# Patient Record
Sex: FEMALE | Race: WHITE | NOT HISPANIC OR LATINO | Employment: PART TIME | URBAN - METROPOLITAN AREA
[De-identification: names, ages, dates, MRNs, and addresses within clinical notes are randomized per-mention and may not be internally consistent; named-entity substitution may affect disease eponyms.]

---

## 2024-02-29 ENCOUNTER — LAB (OUTPATIENT)
Dept: LAB | Facility: LAB | Age: 44
End: 2024-02-29
Payer: COMMERCIAL

## 2024-02-29 ENCOUNTER — OFFICE VISIT (OUTPATIENT)
Dept: PRIMARY CARE | Facility: CLINIC | Age: 44
End: 2024-02-29
Payer: COMMERCIAL

## 2024-02-29 ENCOUNTER — HOSPITAL ENCOUNTER (OUTPATIENT)
Dept: RADIOLOGY | Facility: CLINIC | Age: 44
Discharge: HOME | End: 2024-02-29
Payer: COMMERCIAL

## 2024-02-29 VITALS — HEART RATE: 76 BPM | DIASTOLIC BLOOD PRESSURE: 75 MMHG | SYSTOLIC BLOOD PRESSURE: 127 MMHG

## 2024-02-29 DIAGNOSIS — E78.00 PURE HYPERCHOLESTEROLEMIA: ICD-10-CM

## 2024-02-29 DIAGNOSIS — Z85.43 HISTORY OF OVARIAN CANCER: ICD-10-CM

## 2024-02-29 DIAGNOSIS — M54.2 NECK PAIN: ICD-10-CM

## 2024-02-29 DIAGNOSIS — Z85.43 HISTORY OF OVARIAN CANCER: Primary | ICD-10-CM

## 2024-02-29 DIAGNOSIS — R51.9 NONINTRACTABLE EPISODIC HEADACHE, UNSPECIFIED HEADACHE TYPE: ICD-10-CM

## 2024-02-29 LAB
ALBUMIN SERPL BCP-MCNC: 4.6 G/DL (ref 3.4–5)
ALP SERPL-CCNC: 65 U/L (ref 33–110)
ALT SERPL W P-5'-P-CCNC: 27 U/L (ref 7–45)
ANION GAP SERPL CALC-SCNC: 18 MMOL/L (ref 10–20)
AST SERPL W P-5'-P-CCNC: 22 U/L (ref 9–39)
BILIRUB SERPL-MCNC: 1 MG/DL (ref 0–1.2)
BUN SERPL-MCNC: 11 MG/DL (ref 6–23)
CALCIUM SERPL-MCNC: 10.2 MG/DL (ref 8.6–10.6)
CANCER AG125 SERPL-ACNC: 4.8 U/ML (ref 0–30.2)
CHLORIDE SERPL-SCNC: 104 MMOL/L (ref 98–107)
CHOLEST SERPL-MCNC: 287 MG/DL (ref 0–199)
CHOLESTEROL/HDL RATIO: 4.3
CO2 SERPL-SCNC: 27 MMOL/L (ref 21–32)
CREAT SERPL-MCNC: 0.78 MG/DL (ref 0.5–1.05)
CRP SERPL-MCNC: 0.44 MG/DL
EGFRCR SERPLBLD CKD-EPI 2021: >90 ML/MIN/1.73M*2
ERYTHROCYTE [DISTWIDTH] IN BLOOD BY AUTOMATED COUNT: 14.5 % (ref 11.5–14.5)
ERYTHROCYTE [SEDIMENTATION RATE] IN BLOOD BY WESTERGREN METHOD: 12 MM/H (ref 0–20)
GLUCOSE SERPL-MCNC: 93 MG/DL (ref 74–99)
HCT VFR BLD AUTO: 42.7 % (ref 36–46)
HDLC SERPL-MCNC: 66.9 MG/DL
HGB BLD-MCNC: 13.5 G/DL (ref 12–16)
LDLC SERPL CALC-MCNC: 202 MG/DL
MCH RBC QN AUTO: 30.1 PG (ref 26–34)
MCHC RBC AUTO-ENTMCNC: 31.6 G/DL (ref 32–36)
MCV RBC AUTO: 95 FL (ref 80–100)
NON HDL CHOLESTEROL: 220 MG/DL (ref 0–149)
NRBC BLD-RTO: 0 /100 WBCS (ref 0–0)
PLATELET # BLD AUTO: 419 X10*3/UL (ref 150–450)
POTASSIUM SERPL-SCNC: 5.8 MMOL/L (ref 3.5–5.3)
PROT SERPL-MCNC: 7.9 G/DL (ref 6.4–8.2)
PROT SERPL-MCNC: 7.9 G/DL (ref 6.4–8.2)
RBC # BLD AUTO: 4.49 X10*6/UL (ref 4–5.2)
SODIUM SERPL-SCNC: 143 MMOL/L (ref 136–145)
TRIGL SERPL-MCNC: 90 MG/DL (ref 0–149)
TSH SERPL-ACNC: 1.95 MIU/L (ref 0.44–3.98)
VLDL: 18 MG/DL (ref 0–40)
WBC # BLD AUTO: 5.8 X10*3/UL (ref 4.4–11.3)

## 2024-02-29 PROCEDURE — 84443 ASSAY THYROID STIM HORMONE: CPT

## 2024-02-29 PROCEDURE — 86038 ANTINUCLEAR ANTIBODIES: CPT

## 2024-02-29 PROCEDURE — 72040 X-RAY EXAM NECK SPINE 2-3 VW: CPT | Performed by: RADIOLOGY

## 2024-02-29 PROCEDURE — 99214 OFFICE O/P EST MOD 30 MIN: CPT | Performed by: FAMILY MEDICINE

## 2024-02-29 PROCEDURE — 85027 COMPLETE CBC AUTOMATED: CPT

## 2024-02-29 PROCEDURE — 84155 ASSAY OF PROTEIN SERUM: CPT

## 2024-02-29 PROCEDURE — 36415 COLL VENOUS BLD VENIPUNCTURE: CPT

## 2024-02-29 PROCEDURE — 72040 X-RAY EXAM NECK SPINE 2-3 VW: CPT

## 2024-02-29 PROCEDURE — 80061 LIPID PANEL: CPT

## 2024-02-29 PROCEDURE — 84165 PROTEIN E-PHORESIS SERUM: CPT

## 2024-02-29 PROCEDURE — 86140 C-REACTIVE PROTEIN: CPT

## 2024-02-29 PROCEDURE — 80053 COMPREHEN METABOLIC PANEL: CPT

## 2024-02-29 PROCEDURE — 85652 RBC SED RATE AUTOMATED: CPT

## 2024-02-29 PROCEDURE — 86304 IMMUNOASSAY TUMOR CA 125: CPT

## 2024-02-29 PROCEDURE — 84165 PROTEIN E-PHORESIS SERUM: CPT | Performed by: FAMILY MEDICINE

## 2024-02-29 PROCEDURE — 1036F TOBACCO NON-USER: CPT | Performed by: FAMILY MEDICINE

## 2024-02-29 NOTE — PROGRESS NOTES
Subjective   Patient ID: Vickie Arriaga is a 43 y.o. female who presents for Ham, neck pain    HPI   neck dull pain was at 3-7/10 in the past few mos. pt denies ext  weakness or numbness. pt controlled BM and bladder well. Pt also noticed dull HA lasting a few days each time lately.  No blurry vision, tinnitus, dizziness. no imbalance or falls. No recent injury, wt loss, sweating, nausea, abd pain. Normal urination and bm. No cp, leg pain    Review of Systems    Objective   /75   Pulse 76     Physical Exam  NAD, well groomed, eyes: PERRLA, No sclera icterus. neck: supple, no cervical or axillary lymphadenopathy,  lungs: CTA b/l, heart: RRR, No LE edema, normal pedal pulses, abd: soft, no tenderness, BS+, mild posterior neck tenderness, normal strength and sensation  at bilateral extremities.  Good balance. CNII-XII were grossly intact, good judgment and memory. No depressed mood.    Assessment/Plan   Problem List Items Addressed This Visit             ICD-10-CM    Nonintractable episodic headache R51.9     Check labs for eval         Relevant Orders    NEIL with Reflex to AZEB    C-Reactive Protein (Completed)    Sedimentation Rate (Completed)    Serum Protein Electrophoresis    TSH with reflex to Free T4 if abnormal (Completed)    Comprehensive Metabolic Panel (Completed)    CBC (Completed)    Neck pain M54.2     Cold compress. Check xr         Relevant Orders    XR cervical spine 2-3 views    Pure hypercholesterolemia E78.00     Hyperlipidemia, not  on statin. heck labs for evaluation.  Health diet and regular exercise. Decrease calorie intake to lose wt.  f/u in 3 mos.            Relevant Orders    Lipid panel (Completed)    History of ovarian cancer - Primary Z85.43    Relevant Orders     (Completed)

## 2024-03-01 ENCOUNTER — TELEPHONE (OUTPATIENT)
Dept: PRIMARY CARE | Facility: CLINIC | Age: 44
End: 2024-03-01
Payer: COMMERCIAL

## 2024-03-01 DIAGNOSIS — E78.00 PURE HYPERCHOLESTEROLEMIA: Primary | ICD-10-CM

## 2024-03-01 RX ORDER — ROSUVASTATIN CALCIUM 40 MG/1
40 TABLET, COATED ORAL DAILY
Qty: 90 TABLET | Refills: 1 | Status: SHIPPED | OUTPATIENT
Start: 2024-03-01 | End: 2024-05-10 | Stop reason: SDUPTHER

## 2024-03-01 NOTE — ASSESSMENT & PLAN NOTE
Hyperlipidemia, not  on statin. heck labs for evaluation.  Health diet and regular exercise. Decrease calorie intake to lose wt.  f/u in 3 mos.

## 2024-03-03 LAB — ANA SER QL HEP2 SUBST: NEGATIVE

## 2024-03-04 LAB
ALBUMIN: 4.6 G/DL (ref 3.4–5)
ALPHA 1 GLOBULIN: 0.3 G/DL (ref 0.2–0.6)
ALPHA 2 GLOBULIN: 0.7 G/DL (ref 0.4–1.1)
BETA GLOBULIN: 0.9 G/DL (ref 0.5–1.2)
GAMMA GLOBULIN: 1.3 G/DL (ref 0.5–1.4)
PATH REVIEW-SERUM PROTEIN ELECTROPHORESIS: NORMAL
PROTEIN ELECTROPHORESIS COMMENT: NORMAL

## 2024-05-10 DIAGNOSIS — E78.00 PURE HYPERCHOLESTEROLEMIA: ICD-10-CM

## 2024-05-10 RX ORDER — ROSUVASTATIN CALCIUM 40 MG/1
40 TABLET, COATED ORAL DAILY
Qty: 90 TABLET | Refills: 1 | Status: SHIPPED | OUTPATIENT
Start: 2024-05-10 | End: 2024-11-06

## 2025-04-17 ENCOUNTER — OFFICE VISIT (OUTPATIENT)
Dept: PRIMARY CARE | Facility: CLINIC | Age: 45
End: 2025-04-17
Payer: COMMERCIAL

## 2025-04-17 VITALS
HEART RATE: 68 BPM | WEIGHT: 177 LBS | HEIGHT: 62 IN | SYSTOLIC BLOOD PRESSURE: 125 MMHG | DIASTOLIC BLOOD PRESSURE: 67 MMHG | BODY MASS INDEX: 32.57 KG/M2 | RESPIRATION RATE: 14 BRPM

## 2025-04-17 DIAGNOSIS — Z12.11 COLON CANCER SCREENING: ICD-10-CM

## 2025-04-17 DIAGNOSIS — Z12.31 ENCOUNTER FOR SCREENING MAMMOGRAM FOR MALIGNANT NEOPLASM OF BREAST: ICD-10-CM

## 2025-04-17 DIAGNOSIS — Z00.00 ROUTINE GENERAL MEDICAL EXAMINATION AT A HEALTH CARE FACILITY: Primary | ICD-10-CM

## 2025-04-17 PROCEDURE — 3008F BODY MASS INDEX DOCD: CPT | Performed by: FAMILY MEDICINE

## 2025-04-17 PROCEDURE — 99396 PREV VISIT EST AGE 40-64: CPT | Performed by: FAMILY MEDICINE

## 2025-04-17 PROCEDURE — 1036F TOBACCO NON-USER: CPT | Performed by: FAMILY MEDICINE

## 2025-04-17 ASSESSMENT — PATIENT HEALTH QUESTIONNAIRE - PHQ9
2. FEELING DOWN, DEPRESSED OR HOPELESS: NOT AT ALL
1. LITTLE INTEREST OR PLEASURE IN DOING THINGS: NOT AT ALL
SUM OF ALL RESPONSES TO PHQ9 QUESTIONS 1 AND 2: 0

## 2025-04-17 NOTE — PROGRESS NOTES
pt has regular dental visits. no vision problems. no hearing loss.   Lifestyle: healthy diet. no weight concerns. Pt exercises regularly. no tobacco or alcohol abuse.   Safety elements used: seat belt,  smoke detector at home.   No passive smoke exposure, chemical abuse, domestic violence, anxiety symptoms, depression symptoms.  Pt has  safe driving habits. No tuberculosis exposure.   Reproductive health: she is sexually active. pt has safe sexual behavior  Review of Systems  At least 10/14 systems were reviewed and were negative except as noted    Physical Exam  Constitutional: Alert and in no acute distress. Well developed, well nourished.   Head and Face: Normal.    Eyes: Normal external exam. Pupils: normal size and EOMI. No sclera icterus  Ears, Nose, Mouth, and Throat: External inspection of ears and nose: Normal.  Hearing: Normal.  Nasal mucosa: Normal.  Oropharynx: Normal.    Neck: Supple. No neck mass was observed. Thyroid not enlarged  Cardiovascular: Heart rate and rhythm were normal. Pedal pulses: Normal. No peripheral edema.   Pulmonary: No respiratory distress. Clear bilateral breath sounds.   Chest wall: Normal.    Abdomen: Soft,  nontender; no abdominal mass palpated. No organomegaly. Normal BS.  Musculoskeletal: Gait and station: Normal. No joint effusion, Muscle strength/tone: Normal.    Skin: Normal skin color and pigmentation, normal skin turgor,  no rash.   Neurologic: Cranial nerves 2-12 grossly intact.  Sensation: Normal.   Psychiatric: Judgment and insight: Intact. Alert and oriented x 3. Recent and remote memory: Normal.  Mood and affect: Normal.   Lymphatic: No cervical lymphadenopathy.     unremarkable PE except  obesity. recommend nutritionist eval. Recommend DASH diet and regular exercise. check CBC, CMP, lipid, TSH.  Advise eye exam by an OD yearly and dental exam every 6 months. will monitor lipid and weight yearly. Recommend sunscreen application if exposed to the sun when UV index is  above 2.  Recommend Hep C, hepB, HIV test. recommend   Tdap, hepB, covid shot. recommend colonoscopy  We will call pt regarding lab results. f/u as scheduled.   Recommend mammogram. Pt prefers to see her Gynecologist for evaluation.

## 2025-04-18 ENCOUNTER — APPOINTMENT (OUTPATIENT)
Dept: PRIMARY CARE | Facility: CLINIC | Age: 45
End: 2025-04-18
Payer: COMMERCIAL

## 2025-04-18 LAB
ALBUMIN SERPL-MCNC: 5 G/DL (ref 3.6–5.1)
ALP SERPL-CCNC: 73 U/L (ref 31–125)
ALT SERPL-CCNC: 35 U/L (ref 6–29)
ANION GAP SERPL CALCULATED.4IONS-SCNC: 11 MMOL/L (CALC) (ref 7–17)
AST SERPL-CCNC: 25 U/L (ref 10–30)
BILIRUB SERPL-MCNC: 0.6 MG/DL (ref 0.2–1.2)
BUN SERPL-MCNC: 20 MG/DL (ref 7–25)
CALCIUM SERPL-MCNC: 10 MG/DL (ref 8.6–10.2)
CHLORIDE SERPL-SCNC: 105 MMOL/L (ref 98–110)
CHOLEST SERPL-MCNC: 283 MG/DL
CHOLEST/HDLC SERPL: 4 (CALC)
CO2 SERPL-SCNC: 27 MMOL/L (ref 20–32)
CREAT SERPL-MCNC: 0.82 MG/DL (ref 0.5–0.99)
EGFRCR SERPLBLD CKD-EPI 2021: 90 ML/MIN/1.73M2
ERYTHROCYTE [DISTWIDTH] IN BLOOD BY AUTOMATED COUNT: 13.5 % (ref 11–15)
EST. AVERAGE GLUCOSE BLD GHB EST-MCNC: 117 MG/DL
EST. AVERAGE GLUCOSE BLD GHB EST-SCNC: 6.5 MMOL/L
GLUCOSE SERPL-MCNC: 99 MG/DL (ref 65–99)
HBA1C MFR BLD: 5.7 %
HCT VFR BLD AUTO: 42 % (ref 35–45)
HDLC SERPL-MCNC: 70 MG/DL
HGB BLD-MCNC: 13.7 G/DL (ref 11.7–15.5)
LDLC SERPL CALC-MCNC: 191 MG/DL (CALC)
MCH RBC QN AUTO: 30.5 PG (ref 27–33)
MCHC RBC AUTO-ENTMCNC: 32.6 G/DL (ref 32–36)
MCV RBC AUTO: 93.5 FL (ref 80–100)
NONHDLC SERPL-MCNC: 213 MG/DL (CALC)
PLATELET # BLD AUTO: 424 THOUSAND/UL (ref 140–400)
PMV BLD REES-ECKER: 9.9 FL (ref 7.5–12.5)
POTASSIUM SERPL-SCNC: 5.9 MMOL/L (ref 3.5–5.3)
PROT SERPL-MCNC: 8.3 G/DL (ref 6.1–8.1)
RBC # BLD AUTO: 4.49 MILLION/UL (ref 3.8–5.1)
SODIUM SERPL-SCNC: 143 MMOL/L (ref 135–146)
TRIGL SERPL-MCNC: 101 MG/DL
TSH SERPL-ACNC: 1.96 MIU/L
WBC # BLD AUTO: 7.1 THOUSAND/UL (ref 3.8–10.8)

## 2025-04-21 ENCOUNTER — APPOINTMENT (OUTPATIENT)
Dept: PRIMARY CARE | Facility: CLINIC | Age: 45
End: 2025-04-21
Payer: COMMERCIAL

## 2025-04-21 DIAGNOSIS — E87.5 HYPERKALEMIA: ICD-10-CM

## 2025-04-21 DIAGNOSIS — E78.00 PURE HYPERCHOLESTEROLEMIA: ICD-10-CM

## 2025-04-21 DIAGNOSIS — R79.89 ELEVATED PLATELET COUNT: Primary | ICD-10-CM

## 2025-04-21 DIAGNOSIS — R79.89 LFT ELEVATION: ICD-10-CM

## 2025-04-21 PROCEDURE — 99214 OFFICE O/P EST MOD 30 MIN: CPT | Performed by: FAMILY MEDICINE

## 2025-04-21 RX ORDER — ROSUVASTATIN CALCIUM 40 MG/1
40 TABLET, COATED ORAL DAILY
Qty: 90 TABLET | Refills: 3 | Status: SHIPPED | OUTPATIENT
Start: 2025-04-21 | End: 2026-04-21

## 2025-04-21 NOTE — ASSESSMENT & PLAN NOTE
Hyperlipidemia on statin.   Health diet and regular exercise. Decrease calorie intake to lose wt.  f/u in 3 mos.     Orders:    rosuvastatin (Crestor) 40 mg tablet; Take 1 tablet (40 mg) by mouth once daily.

## 2025-04-21 NOTE — PROGRESS NOTES
Subjective   Patient ID: Vickie Arriaga is a 44 y.o. female who presents for lab fu    HPI    K+, platelets,  LFT, A1c and LDL were elevated. Normal appetite. No CP, HA, dizziness, heart palpitation. No claudication or cold LE.  No jaundice, dark stools or wt loss    Review of Systems    Objective   There were no vitals taken for this visit.    Physical Exam  No acute distress. no sclera icterus, Lungs: CTA b/l, Heart: RRR,  Abdomen: soft, no abd tenderness. No organomegaly, Bowel sound: +, good balance. no jaundice or skin bruise, no tremor,  good memory    Assessment/Plan   Assessment & Plan  Pure hypercholesterolemia  Hyperlipidemia on statin.   Health diet and regular exercise. Decrease calorie intake to lose wt.  f/u in 3 mos.     Orders:    rosuvastatin (Crestor) 40 mg tablet; Take 1 tablet (40 mg) by mouth once daily.    Elevated platelet count  Will monitor       LFT elevation  No otc meds or eoth. Check hepatitis panel and liver us  Orders:    Hepatitis panel, acute; Future    US abdomen limited liver; Future    Hyperkalemia  Avoid foods high in K+ such as banana and potato. Will monitor.

## 2025-04-21 NOTE — ASSESSMENT & PLAN NOTE
No otc meds or eoth. Check hepatitis panel and liver us  Orders:    Hepatitis panel, acute; Future    US abdomen limited liver; Future

## 2025-04-22 ENCOUNTER — APPOINTMENT (OUTPATIENT)
Dept: PRIMARY CARE | Facility: CLINIC | Age: 45
End: 2025-04-22
Payer: COMMERCIAL

## 2025-04-22 LAB
HAV IGM SERPL QL IA: NORMAL
HBV CORE IGM SERPL QL IA: NORMAL
HBV SURFACE AG SERPL QL IA: NORMAL
HCV AB SERPL QL IA: NORMAL

## 2025-09-02 ENCOUNTER — APPOINTMENT (OUTPATIENT)
Dept: PRIMARY CARE | Facility: CLINIC | Age: 45
End: 2025-09-02
Payer: COMMERCIAL

## 2025-09-03 ENCOUNTER — TELEPHONE (OUTPATIENT)
Dept: PRIMARY CARE | Facility: CLINIC | Age: 45
End: 2025-09-03
Payer: COMMERCIAL

## 2025-09-04 ENCOUNTER — HOSPITAL ENCOUNTER (OUTPATIENT)
Dept: RADIOLOGY | Facility: CLINIC | Age: 45
Discharge: HOME | End: 2025-09-04
Payer: COMMERCIAL

## 2025-09-04 DIAGNOSIS — Z12.31 ENCOUNTER FOR SCREENING MAMMOGRAM FOR MALIGNANT NEOPLASM OF BREAST: ICD-10-CM

## 2025-09-04 DIAGNOSIS — R79.89 LFT ELEVATION: ICD-10-CM

## 2025-09-04 PROCEDURE — 77067 SCR MAMMO BI INCL CAD: CPT | Performed by: RADIOLOGY

## 2025-09-04 PROCEDURE — 77063 BREAST TOMOSYNTHESIS BI: CPT | Performed by: RADIOLOGY

## 2025-09-04 PROCEDURE — 76705 ECHO EXAM OF ABDOMEN: CPT | Performed by: RADIOLOGY

## 2025-09-04 PROCEDURE — 77063 BREAST TOMOSYNTHESIS BI: CPT

## 2025-09-04 PROCEDURE — 76705 ECHO EXAM OF ABDOMEN: CPT

## 2025-09-08 ENCOUNTER — APPOINTMENT (OUTPATIENT)
Dept: PRIMARY CARE | Facility: CLINIC | Age: 45
End: 2025-09-08
Payer: COMMERCIAL

## 2025-11-26 ENCOUNTER — APPOINTMENT (OUTPATIENT)
Dept: PRIMARY CARE | Facility: CLINIC | Age: 45
End: 2025-11-26
Payer: COMMERCIAL